# Patient Record
Sex: FEMALE | Race: WHITE
[De-identification: names, ages, dates, MRNs, and addresses within clinical notes are randomized per-mention and may not be internally consistent; named-entity substitution may affect disease eponyms.]

---

## 2019-04-14 ENCOUNTER — HOSPITAL ENCOUNTER (EMERGENCY)
Dept: HOSPITAL 53 - M ED | Age: 74
Discharge: ANOTHER HOSPITAL | End: 2019-04-14
Payer: MEDICARE

## 2019-04-14 VITALS — DIASTOLIC BLOOD PRESSURE: 77 MMHG | SYSTOLIC BLOOD PRESSURE: 99 MMHG

## 2019-04-14 VITALS — BODY MASS INDEX: 33.19 KG/M2 | HEIGHT: 62 IN | WEIGHT: 180.38 LBS

## 2019-04-14 DIAGNOSIS — R93.0: Primary | ICD-10-CM

## 2019-04-14 DIAGNOSIS — J44.9: ICD-10-CM

## 2019-04-14 DIAGNOSIS — R91.8: ICD-10-CM

## 2019-04-14 DIAGNOSIS — F17.210: ICD-10-CM

## 2019-04-14 LAB
ALBUMIN SERPL BCG-MCNC: 4.1 GM/DL (ref 3.2–5.2)
ALT SERPL W P-5'-P-CCNC: 21 U/L (ref 12–78)
BASOPHILS # BLD AUTO: 0 10^3/UL (ref 0–0.2)
BASOPHILS NFR BLD AUTO: 0.3 % (ref 0–1)
BILIRUB CONJ SERPL-MCNC: < 0.1 MG/DL (ref 0–0.2)
BILIRUB SERPL-MCNC: 0.3 MG/DL (ref 0.2–1)
BUN SERPL-MCNC: 17 MG/DL (ref 7–18)
CALCIUM SERPL-MCNC: 8.8 MG/DL (ref 8.8–10.2)
CHLORIDE SERPL-SCNC: 107 MEQ/L (ref 98–107)
CK MB CFR.DF SERPL CALC: 6.1
CK MB SERPL-MCNC: 2 NG/ML (ref ?–3.6)
CK SERPL-CCNC: 41 U/L (ref 26–192)
CO2 SERPL-SCNC: 27 MEQ/L (ref 21–32)
CREAT SERPL-MCNC: 0.83 MG/DL (ref 0.55–1.3)
EOSINOPHIL # BLD AUTO: 0.2 10^3/UL (ref 0–0.5)
EOSINOPHIL NFR BLD AUTO: 2.6 % (ref 0–3)
GFR SERPL CREATININE-BSD FRML MDRD: > 60 ML/MIN/{1.73_M2} (ref 39–?)
GLUCOSE SERPL-MCNC: 94 MG/DL (ref 70–100)
HCT VFR BLD AUTO: 46 % (ref 36–47)
HGB BLD-MCNC: 15.1 G/DL (ref 12–15.5)
LYMPHOCYTES # BLD AUTO: 1.4 10^3/UL (ref 1.5–4.5)
LYMPHOCYTES NFR BLD AUTO: 20 % (ref 24–44)
MCH RBC QN AUTO: 29.8 PG (ref 27–33)
MCHC RBC AUTO-ENTMCNC: 32.8 G/DL (ref 32–36.5)
MCV RBC AUTO: 90.9 FL (ref 80–96)
MONOCYTES # BLD AUTO: 0.6 10^3/UL (ref 0–0.8)
MONOCYTES NFR BLD AUTO: 9 % (ref 0–5)
NEUTROPHILS # BLD AUTO: 4.7 10^3/UL (ref 1.8–7.7)
NEUTROPHILS NFR BLD AUTO: 67.5 % (ref 36–66)
PLATELET # BLD AUTO: 253 10^3/UL (ref 150–450)
POTASSIUM SERPL-SCNC: 4 MEQ/L (ref 3.5–5.1)
PROT SERPL-MCNC: 6.8 GM/DL (ref 6.4–8.2)
RBC # BLD AUTO: 5.06 10^6/UL (ref 4–5.4)
SODIUM SERPL-SCNC: 142 MEQ/L (ref 136–145)
TROPONIN I SERPL-MCNC: < 0.02 NG/ML (ref ?–0.1)
TSH SERPL DL<=0.005 MIU/L-ACNC: 2.67 UIU/ML (ref 0.36–3.74)
WBC # BLD AUTO: 7 10^3/UL (ref 4–10)

## 2019-04-14 PROCEDURE — 93005 ELECTROCARDIOGRAM TRACING: CPT

## 2019-04-14 PROCEDURE — 80048 BASIC METABOLIC PNL TOTAL CA: CPT

## 2019-04-14 PROCEDURE — 82553 CREATINE MB FRACTION: CPT

## 2019-04-14 PROCEDURE — 71260 CT THORAX DX C+: CPT

## 2019-04-14 PROCEDURE — 70551 MRI BRAIN STEM W/O DYE: CPT

## 2019-04-14 PROCEDURE — 84484 ASSAY OF TROPONIN QUANT: CPT

## 2019-04-14 PROCEDURE — 84443 ASSAY THYROID STIM HORMONE: CPT

## 2019-04-14 PROCEDURE — 85025 COMPLETE CBC W/AUTO DIFF WBC: CPT

## 2019-04-14 PROCEDURE — 99285 EMERGENCY DEPT VISIT HI MDM: CPT

## 2019-04-14 PROCEDURE — 72040 X-RAY EXAM NECK SPINE 2-3 VW: CPT

## 2019-04-14 PROCEDURE — 80076 HEPATIC FUNCTION PANEL: CPT

## 2019-04-14 PROCEDURE — 72100 X-RAY EXAM L-S SPINE 2/3 VWS: CPT

## 2019-04-14 PROCEDURE — 71045 X-RAY EXAM CHEST 1 VIEW: CPT

## 2019-04-14 PROCEDURE — 94760 N-INVAS EAR/PLS OXIMETRY 1: CPT

## 2019-04-14 PROCEDURE — 82550 ASSAY OF CK (CPK): CPT

## 2019-04-14 PROCEDURE — 93041 RHYTHM ECG TRACING: CPT

## 2019-04-14 PROCEDURE — 70450 CT HEAD/BRAIN W/O DYE: CPT

## 2019-04-14 PROCEDURE — 72070 X-RAY EXAM THORAC SPINE 2VWS: CPT

## 2019-04-14 RX ADMIN — FAMOTIDINE ONE MG: 20 TABLET, FILM COATED ORAL at 20:41

## 2019-04-14 NOTE — ECGEPIP
Stationary ECG Study

                           [facility] - ED

                                       

                                       Test Date:    2019

Pat Name:     JUNIOR ESPARZA              Department:   

Patient ID:   [id number]                 Room:         -

Gender:       F                        Technician:   TC

:          1945               Requested By: SHILO IBRAHIM

Order Number: [id number]     Reading MD:   Rey Bailey

                                 Measurements

Intervals                              Axis          

Rate:         96                       P:            67

MN:           146                      QRS:          34

QRSD:         96                       T:            57

QT:           351                                    

QTc:          445                                    

                           Interpretive Statements

SINUS RHYTHM

NSTTW ABNORMALITIES

NO PRIORS FOR COMPARISON

 

Electronically Signed On 2019 19:27:24 EDT by Rey Bailey

## 2019-04-14 NOTE — REPVR
EXAM: 

 MR Head Without Contrast 



EXAM DATE/TIME: 

 4/14/2019 5:20 PM 



CLINICAL HISTORY: 

 73 years old, female; Signs and symptoms and abnormal findings; Abnormal 

radiologic findings of head/skull; Intracranial mass / space-occupying lesion; 

Numbness / parasthesia; Right; Patient HX: Right sided numbness and weakness 



TECHNIQUE: 

 Imaging protocol: MR of the head without contrast. 



COMPARISON: 

 CT Head without contrast 4/14/2019 1:32 PM 



FINDINGS: 

 Brain:  There is a large cyst in the left midbrain. It measures 14 x 13 x 14 

mm. There is a surrounding rim of hyperintense signal on flair and T2-weighted 

images consistent with edema. DWI images demonstrate no evidence of restricted 

diffusion. Gradient echo images demonstrate no evidence of hemorrhage. There is 

no extra-axial collection. There is no mass. There are no abnormal flow voids. 

 Ventricles: Normal. No ventriculomegaly. 

 Bones/joints: Unremarkable. 

 Soft tissues: Normal. 

 Sinuses: Normal as visualized. No acute sinusitis. 

 Mastoid air cells: Normal as visualized. No mastoid effusion. 

 Orbits: Unremarkable. 





IMPRESSION: 

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 This was seen 

on prior CT. 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 

space. There is no restricted diffusion as would be seen with an infarct or 

abscess. Cystic neoplasm cannot be excluded, particularly as this is a 

noncontrast scan. 



Electronically signed by: Alfonso Olivo On 04/14/2019  18:34:14 PM

## 2019-04-15 NOTE — REP
Portable chest x-ray:  Single view.

 

History:  Altered mental status.  No comparison chest x-ray.

 

Findings:  EKG electrodes are seen.  There is a large oval-shaped mass-like

opacity in the right infrahilar region.  This measures approximately 6 cm.  This

raises a question of pulmonary malignancy.  Lung fields are otherwise clear.

Heart is not enlarged.  Pulmonary vasculature is not increased.  No bony

destructive lesion is seen.

 

Impression:

 

Mass-like opacity in the right infrahilar region, 6.2 cm in greatest diameter.

Rule out malignancy.  Consider chest CT.

 

 

Electronically Signed by

Maurisio Josue MD 04/14/2019 01:32 P

## 2019-04-15 NOTE — REP
CERVICAL SPINE SERIES:  Two views.

 

HISTORY:  Neck pain.

 

COMPARISON STUDY:  September 19, 2016.

 

FINDINGS:  Cervical vertebral body heights are preserved.  Alignment is normal

and unchanged.  There is degenerative disc disease at C4-5 and to a lesser extent

C5-6 and C6-7.  Disc space narrowing and sclerosis are present at C4-5 unchanged

from 2016 study.  There is mild osteoarthritic facet changes in the mid cervical

spine.  No bony destructive lesion is seen.  There is diffuse osteopenia.

 

IMPRESSION: Degenerative spondylosis unchanged from September 20, 2016.  This is

most pronounced at C4-5.  Diffuse osteopenia.

 

 

Electronically Signed by

Maurisio Josue MD 04/15/2019 10:28 A

## 2019-04-15 NOTE — REP
CT CHEST WITH IV CONTRAST:

 

HISTORY:  Cough.  Right lung mass on chest x-ray.

 

No comparison chest CT.

 

CT contrast dose:  75 mL  of intravenous Isovue 370.

 

CT FINDINGS:  A CT study confirms the presence of a right hilar mass associated

with some atelectasis in the right middle lobe.  A there is a smaller spiculated

density along the inferior margin of the mass projecting in the right lower lobe.

The mass is associated with the adenopathy measures 4.9 x 4.8 x 4.9 cm.  It

extends into the right hilus.  There is some attenuation and stenosis of the

right lower lobe pulmonary vein as it enters the left atrium.  There is a

pretracheal lymph node measuring 1 cm in short axis dimension.  There is an

enlarged subcarinal lymph node measuring 16 x 13 x 20 mm.  No other mediastinal

lymph nodes are seen.  No other lung mass is observed.  There is no evidence of

pleural or pericardial effusion.  No adrenal lesion is seen on either side.  The

visualized upper abdominal structures are unremarkable except for the presence of

a cyst affecting the upper pole of the right kidney.  This measures 5.1 cm.  No

bony destructive lesion is seen.

 

IMPRESSION:

 

There is a 4.9 cm right lower lobe infrahilar mass with right hilar adenopathy,

mediastinal adenopathy, and post obstructive atelectasis in the right middle

lobe. Findings are most consistent with primary lung malignancy.

 

 

Electronically Signed by

Maurisio Josue MD 04/15/2019 10:46 A

## 2019-04-15 NOTE — REP
CT brain without contrast:

 

History:  Altered mental status.

 

No comparison study.

 

Findings:  Preliminary digital  radiograph demonstrates that the patient is

nearly edentulous.  Bone window settings show no bony destructive lesion.  No

skull fracture is seen.  Visualized paranasal sinuses are clear.  No intraorbital

abnormality is appreciated.

 

On soft tissue window settings, there is a low density oval-shaped lesion in the

left cerebral peduncle with surrounding edema.  This lesion measures 1.7 cm in

diameter.  It is compatible with metastatic or primary malignant lesion.  Abscess

and infarction are considered less likely.  No other intracranial mass lesion is

seen.  There is no evidence of intracranial hemorrhage.  No extra-axial fluid

collection or midline shift.  Gray-white differentiation pattern is otherwise

intact.

 

Impression:

 

17 mm oval shaped low density mass lesion in the left cerebral peduncle with

adjacent edema and swelling.  Most compatible with metastasis versus primary

malignancy, less likely abscess or infarction.

 

 

Electronically Signed by

Maurisio Josue MD 04/15/2019 10:46 A

## 2019-04-15 NOTE — REP
LUMBAR SPINE SERIES:  Three views.

 

HISTORY:  Neck pain.

 

FINDINGS:  There is mild wedging of the L4 vertebral body with partial collapse

of the superior endplate.  This appears to be chronic as there is reactive

sclerosis and spurring.  No acute fracture is seen.   Discogenic is spurring is

also noted L2-3 and L1-2 as well as at L5-S1.  There is a rim calcified Mass-like

lesion in the anterior mid abdomen  just to the left of midline.  This measures

approximately 4.2 cm in diameter.  It appears separate from the abdominal aorta

but the peripheral calcification pattern resembles an aneurysm.

 

There is degenerative sclerosis and spur formation in the SI joint on the left.

Mild facet arthropathy is noted.  Psoas margins are symmetric.

 

IMPRESSION: Degenerative disc and osteoarthritic facet changes.  Old partial

wedge compression deformity at L4.  4.2 cm peripherally calcified lesion in the

central abdomen of uncertain significance.

 

 

Electronically Signed by

Maurisio Josue MD 04/15/2019 10:29 A

## 2019-04-15 NOTE — REP
Thoracic spine series:  Two views.

 

History:  Neck pain. Michael study September 19, 2016.

 

Findings:  There is minimal anterior wedging of one of the mid-thoracic vertebrae

unchanged from the 2016 prior study.  Thoracic vertebral body heights are

otherwise preserved.  Alignment is normal.  Pedicles and posterior elements are

intact.  There are a is some discogenic spurring anteriorly in the mid thoracic

spine unchanged. No paravertebral soft-tissue mass is seen.  Right infrahilar

soft tissue density is seen as noted on chest x-ray.

 

Impression:

 

Right lung parenchymal opacity again seen.  Mild degenerative changes.  No bony

destructive lesion.

 

 

Electronically Signed by

Maurisio Josue MD 04/14/2019 02:35 P